# Patient Record
Sex: MALE | Race: WHITE | Employment: UNEMPLOYED | URBAN - METROPOLITAN AREA
[De-identification: names, ages, dates, MRNs, and addresses within clinical notes are randomized per-mention and may not be internally consistent; named-entity substitution may affect disease eponyms.]

---

## 2019-03-05 ENCOUNTER — OFFICE VISIT (OUTPATIENT)
Dept: RHEUMATOLOGY | Facility: CLINIC | Age: 59
End: 2019-03-05
Payer: MEDICAID

## 2019-03-05 ENCOUNTER — APPOINTMENT (OUTPATIENT)
Dept: LAB | Facility: HOSPITAL | Age: 59
End: 2019-03-05
Attending: INTERNAL MEDICINE
Payer: MEDICAID

## 2019-03-05 VITALS — SYSTOLIC BLOOD PRESSURE: 149 MMHG | HEIGHT: 71 IN | HEART RATE: 112 BPM | DIASTOLIC BLOOD PRESSURE: 85 MMHG

## 2019-03-05 DIAGNOSIS — M53.3 COCCYGEAL PAIN: ICD-10-CM

## 2019-03-05 DIAGNOSIS — R20.9 BILATERAL COLD FEET: Primary | ICD-10-CM

## 2019-03-05 DIAGNOSIS — M79.10 MYALGIA: ICD-10-CM

## 2019-03-05 DIAGNOSIS — R20.9 BILATERAL COLD FEET: ICD-10-CM

## 2019-03-05 LAB
ALT SERPL-CCNC: 88 U/L (ref 16–61)
AST SERPL-CCNC: 52 U/L (ref 15–37)
C3 SERPL-MCNC: 135 MG/DL (ref 90–180)
C4 SERPL-MCNC: 36 MG/DL (ref 10–40)
CK SERPL-CCNC: 60 U/L (ref 39–308)
CRP SERPL-MCNC: 2.32 MG/DL (ref ?–0.3)
ERYTHROCYTE [SEDIMENTATION RATE] IN BLOOD: 17 MM/HR (ref 0–20)
RHEUMATOID FACT SERPL-ACNC: <10 IU/ML (ref ?–15)

## 2019-03-05 PROCEDURE — 85730 THROMBOPLASTIN TIME PARTIAL: CPT

## 2019-03-05 PROCEDURE — 86038 ANTINUCLEAR ANTIBODIES: CPT

## 2019-03-05 PROCEDURE — 82595 ASSAY OF CRYOGLOBULIN: CPT

## 2019-03-05 PROCEDURE — 99212 OFFICE O/P EST SF 10 MIN: CPT | Performed by: INTERNAL MEDICINE

## 2019-03-05 PROCEDURE — 85613 RUSSELL VIPER VENOM DILUTED: CPT

## 2019-03-05 PROCEDURE — 86431 RHEUMATOID FACTOR QUANT: CPT

## 2019-03-05 PROCEDURE — 84460 ALANINE AMINO (ALT) (SGPT): CPT

## 2019-03-05 PROCEDURE — 85390 FIBRINOLYSINS SCREEN I&R: CPT

## 2019-03-05 PROCEDURE — 85610 PROTHROMBIN TIME: CPT

## 2019-03-05 PROCEDURE — 86160 COMPLEMENT ANTIGEN: CPT

## 2019-03-05 PROCEDURE — 36415 COLL VENOUS BLD VENIPUNCTURE: CPT

## 2019-03-05 PROCEDURE — 99244 OFF/OP CNSLTJ NEW/EST MOD 40: CPT | Performed by: INTERNAL MEDICINE

## 2019-03-05 PROCEDURE — 82550 ASSAY OF CK (CPK): CPT

## 2019-03-05 PROCEDURE — 84450 TRANSFERASE (AST) (SGOT): CPT

## 2019-03-05 PROCEDURE — 85598 HEXAGNAL PHOSPH PLTLT NEUTRL: CPT

## 2019-03-05 PROCEDURE — 86140 C-REACTIVE PROTEIN: CPT

## 2019-03-05 PROCEDURE — 85652 RBC SED RATE AUTOMATED: CPT

## 2019-03-05 PROCEDURE — 82085 ASSAY OF ALDOLASE: CPT

## 2019-03-05 RX ORDER — ALPRAZOLAM 2 MG/1
TABLET ORAL
Refills: 2 | COMMUNITY
Start: 2019-02-19

## 2019-03-05 RX ORDER — GABAPENTIN 100 MG/1
100 CAPSULE ORAL 3 TIMES DAILY
COMMUNITY

## 2019-03-05 NOTE — PROGRESS NOTES
Brigette Perez is a 60year old male who presents for Patient presents with:  Consult: Pt feels fatigue, carries warmth blanket because he feels hot and cold. Onset starts at buttocks and radiates to legs. 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  -     2/24/2019 - chest

## 2019-03-06 ENCOUNTER — TELEPHONE (OUTPATIENT)
Dept: RHEUMATOLOGY | Facility: CLINIC | Age: 59
End: 2019-03-06

## 2019-03-06 LAB
APTT PPP: 28.6 SECONDS (ref 23.2–35.3)
CONFIRM APTT STACLOT: POSITIVE
CONFIRM DRVVT: 1.1 S (ref 0–1.1)
PROTHROMBIN TIME: 13.4 SECONDS (ref 11.8–14.5)

## 2019-03-06 NOTE — TELEPHONE ENCOUNTER
Pt called to f/u to last message see msg 3/6/19 435pm    Fax [phone]    Pt requesting call when order is corrected and sent so that he may schedule appt ASAP

## 2019-03-06 NOTE — TELEPHONE ENCOUNTER
Pt called in stating that he was advised by [facility] that his order was placed incorrectly. Pt was advised that the US order dated 3/5 needs to state either arterial or venous.   Pt stated that he will call back with the fax number for

## 2019-03-06 NOTE — TELEPHONE ENCOUNTER
Original imaging order printed (CPT code 96209) and \"arterial ankle brachial index\" written in comments section.      Order was faxed to [phone] as requested, confirmation received - office telephone # provided on cover sheet for additional questions

## 2019-03-06 NOTE — TELEPHONE ENCOUNTER
Dr. Morenita Vang- please see original message below. Per pt, US order needs to state either arterial or venous. Please advise.

## 2019-03-07 LAB
ALDOLASE, SERUM: 6 U/L
NUCLEAR IGG TITR SER IF: NEGATIVE {TITER}

## 2019-03-20 ENCOUNTER — OFFICE VISIT (OUTPATIENT)
Dept: RHEUMATOLOGY | Facility: CLINIC | Age: 59
End: 2019-03-20
Payer: MEDICAID

## 2019-03-20 ENCOUNTER — TELEPHONE (OUTPATIENT)
Dept: RHEUMATOLOGY | Facility: CLINIC | Age: 59
End: 2019-03-20

## 2019-03-20 VITALS
HEIGHT: 71 IN | BODY MASS INDEX: 28.42 KG/M2 | SYSTOLIC BLOOD PRESSURE: 108 MMHG | DIASTOLIC BLOOD PRESSURE: 77 MMHG | HEART RATE: 94 BPM | WEIGHT: 203 LBS

## 2019-03-20 DIAGNOSIS — R20.2 PARESTHESIA OF BILATERAL LEGS: ICD-10-CM

## 2019-03-20 DIAGNOSIS — R20.9 BILATERAL COLD FEET: Primary | ICD-10-CM

## 2019-03-20 DIAGNOSIS — R76.0 LUPUS ANTICOAGULANT POSITIVE: ICD-10-CM

## 2019-03-20 PROCEDURE — 99213 OFFICE O/P EST LOW 20 MIN: CPT | Performed by: INTERNAL MEDICINE

## 2019-03-20 PROCEDURE — 99212 OFFICE O/P EST SF 10 MIN: CPT | Performed by: INTERNAL MEDICINE

## 2019-03-20 RX ORDER — MIRTAZAPINE 30 MG/1
TABLET, FILM COATED ORAL DAILY
COMMUNITY

## 2019-03-20 NOTE — PATIENT INSTRUCTIONS
1. check lab in 6/5/2019   2. Check arterial dopplers . 3. Follow up with dr. Eliana Cheung for liver tests   4. Return to clinic in 3-4 months.

## 2019-03-20 NOTE — PROGRESS NOTES
Jimbo Hoang is a 60year old male who presents for Patient presents with:  Elbow Pain: coccyx pain, legs feel cold  Results  . HPI:     I had the pleasure of seeing Jimbo Hoang on 3/5/2019 for evaluation. Dr. Umu Rivera.      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  . .   PT      11.8 - 14.5 seconds 13.4   APTT      23.2 - 35.3 seconds 28.6   Hexagonal Phase Staclot LA      Negative Positive (A)   DRVVT Ratio      0.0 - 1.1 1.1   AST (SGOT)      15 - 37 U/L 52 (H)   ALT (SGPT still doesn't explain his sensation of coldness   Check bilateral arteral walking doppler -   - will schedule a arteril treadmill test - - since he's had 40 years smoking hx   - consideration could be  emg/ncv studies   - results from mri lumbar spine /and

## 2019-03-21 NOTE — TELEPHONE ENCOUNTER
Pt procedure/Testing:US arterial lower ext complete  Pt insurance/number to contact:[employer]  Insurance ID# and SENWA:[id number]  Dx:R20.2, R20.9  XOE:[id number]  Indication for test:Paresthesia of bilateral legs/Bilateral cold feet  Procedure scheduled date:  Pr

## 2019-03-22 NOTE — TELEPHONE ENCOUNTER
Pt aware PA approved of US legs and he can schedule at [facility]. He can call office back if order needs to be faxed to them.

## 2019-03-22 NOTE — TELEPHONE ENCOUNTER
DARRIAN BRUNNER approved for US arterial lower ext complete, #[id number]. US to be completed at [facility], does pt need a copy of the order faxed to [facility]?

## 2019-03-26 ENCOUNTER — TELEPHONE (OUTPATIENT)
Dept: RHEUMATOLOGY | Facility: CLINIC | Age: 59
End: 2019-03-26

## 2019-03-26 NOTE — TELEPHONE ENCOUNTER
Please let him know that his lower extremity LILLIANA and physiological arterial study is normal in both legs. His circulation is fine. Dr. Agapito Van will be notified tomorrow    Thanks.

## 2019-03-26 NOTE — TELEPHONE ENCOUNTER
Spoke with pt and he is aware of his arterial US of BLE result is normal. Advised message would be sent to Dr. Dhruv Fleming for next steps. He reports health is deteriorating by the day.  Offered to schedule a follow up appointment with Dr. Ion Gillespie tomorrow at [facility];

## 2019-03-26 NOTE — TELEPHONE ENCOUNTER
Patient tyler ultrasound Yesterday and feels that is not getting better and health is rapidly declining and becoming more short of breathe. Requesting next steps or if Dr. Brianda Mar can get results ASAP.

## 2019-03-26 NOTE — TELEPHONE ENCOUNTER
Spoke with pt and he reports constant chills. He feels he is getting weaker every day. His symptoms have been going on for 3-4 months now. He does not feel his SOB is worsening or the need to go to the ER for further evaluation and treatment.  Office will c

## 2019-03-27 NOTE — TELEPHONE ENCOUNTER
Talked to pt. - arterial dopplers  U/s were normal - d/w him that he has to follow up with his pcp   He is fine with that. He's feeling weaker and will see his pcp.    Please fax report over to pcp -

## 2019-06-24 PROBLEM — H00.12 CHALAZION OF RIGHT LOWER EYELID: Status: ACTIVE | Noted: 2019-06-24

## 2019-06-24 PROBLEM — H02.886 MEIBOMIAN GLAND DYSFUNCTION (MGD) OF BOTH EYES: Status: ACTIVE | Noted: 2019-06-24

## 2019-06-24 PROBLEM — H02.883 MEIBOMIAN GLAND DYSFUNCTION (MGD) OF BOTH EYES: Status: ACTIVE | Noted: 2019-06-24

## 2019-08-26 ENCOUNTER — TELEPHONE (OUTPATIENT)
Dept: RHEUMATOLOGY | Facility: CLINIC | Age: 59
End: 2019-08-26

## 2019-08-26 NOTE — TELEPHONE ENCOUNTER
Pt. Needs to sign release of information from [facility] to send us records.  He can get lupus anti coagulant from his pcp - we can mail him the order and he can get at his convenience

## 2019-08-26 NOTE — TELEPHONE ENCOUNTER
Patient called back to advise of share code of [id number] for records from [facility]

## 2019-08-26 NOTE — TELEPHONE ENCOUNTER
Please see below. He reports he was at [facility] ER. He is asking if he has to complete the lupus anticoagulant comp. Pt advised to have it completed but he wants office to get records from [facility]. Please advise.

## 2019-08-26 NOTE — TELEPHONE ENCOUNTER
Patient states always cold and has chills and short of breathe all the time. Patient was in hospital and has labs done 08/25/2019 and wanted to know if blood could be used for LUPUS ANTICOAGULANT COMP [[id number]] lab ordered.  I advised per nurse that depen

## 2019-08-27 ENCOUNTER — TELEPHONE (OUTPATIENT)
Dept: RHEUMATOLOGY | Facility: CLINIC | Age: 59
End: 2019-08-27

## 2019-08-27 NOTE — TELEPHONE ENCOUNTER
Pt states he was in the [facility]  on Sunday and would like to know if we can call the hospital and see if they tested him for Lupus Anticoagulation on Sunday per pt needs this test done but is unsure if it was already done from his ER vi

## 2019-08-27 NOTE — TELEPHONE ENCOUNTER
Pt advised per provider's message below. Pt stated office can use code. Office unable to see pt's record in [facility]. Advised he could have records faxed to [phone]. MARIO form mailed to pt with lab order.  Pt advised to complete lab with PCP or h

## 2019-08-27 NOTE — TELEPHONE ENCOUNTER
Spoke with Sad[facility] and lab does not have the specimen needed to run the Lupus anticoagulation complete; she stated a fresh sample was needed. Order faxed to [phone] as requested per pt.

## 2019-09-03 ENCOUNTER — TELEPHONE (OUTPATIENT)
Dept: RHEUMATOLOGY | Facility: CLINIC | Age: 59
End: 2019-09-03

## 2019-09-03 NOTE — TELEPHONE ENCOUNTER
Ok to let pt. Know that lupus anti coagulant at [facility] on 8/26/2019 - was negative - means the first one was false positive - no need for follow up with me regarding this in the future. He should follow up with pcp.

## 2020-06-19 PROBLEM — H04.123 DRY EYE SYNDROME OF BILATERAL LACRIMAL GLANDS: Status: ACTIVE | Noted: 2020-06-19

## 2021-11-22 PROBLEM — Z96.1 PSEUDOPHAKIA OF RIGHT EYE: Status: ACTIVE | Noted: 2021-11-22

## 2022-01-10 PROBLEM — Z96.1 PSEUDOPHAKIA OF LEFT EYE: Status: ACTIVE | Noted: 2022-01-10

## (undated) NOTE — LETTER
3/20/2019              Florin Gamboa        [address] [place] [facility] [address]         To Whom it may concern:     This is to certify that Opal Richard had an appointment on 3/20/2019 at 2:44 PM with Bhaskar Che MD.        Michael Hanks

## (undated) NOTE — LETTER
07/12/19        Xochitl Feldman  [address] [place] [id number]      Dear Miguelangel Madrid,    [facility] records indicate that you have outstanding lab work and or testing that was ordered for you and has not yet been completed:  Orders Placed This Encount